# Patient Record
Sex: FEMALE | Race: WHITE | Employment: FULL TIME | URBAN - METROPOLITAN AREA
[De-identification: names, ages, dates, MRNs, and addresses within clinical notes are randomized per-mention and may not be internally consistent; named-entity substitution may affect disease eponyms.]

---

## 2017-02-17 ENCOUNTER — OFFICE VISIT (OUTPATIENT)
Dept: ORTHOPEDIC SURGERY | Age: 62
End: 2017-02-17

## 2017-02-17 VITALS
BODY MASS INDEX: 39.2 KG/M2 | DIASTOLIC BLOOD PRESSURE: 82 MMHG | SYSTOLIC BLOOD PRESSURE: 153 MMHG | HEIGHT: 62 IN | HEART RATE: 84 BPM | WEIGHT: 213 LBS

## 2017-02-17 DIAGNOSIS — Z98.890 S/P LEFT KNEE ARTHROSCOPY: ICD-10-CM

## 2017-02-17 DIAGNOSIS — M17.32 POST-TRAUMATIC OSTEOARTHRITIS OF LEFT KNEE: Primary | ICD-10-CM

## 2017-02-17 DIAGNOSIS — G89.29 CHRONIC PAIN OF LEFT KNEE: ICD-10-CM

## 2017-02-17 DIAGNOSIS — M25.562 CHRONIC PAIN OF LEFT KNEE: ICD-10-CM

## 2017-02-17 PROCEDURE — 20610 DRAIN/INJ JOINT/BURSA W/O US: CPT | Performed by: PHYSICIAN ASSISTANT

## 2017-02-17 PROCEDURE — 99213 OFFICE O/P EST LOW 20 MIN: CPT | Performed by: PHYSICIAN ASSISTANT

## 2017-03-03 RX ORDER — MELOXICAM 15 MG/1
TABLET ORAL
Qty: 30 TABLET | Refills: 0 | Status: SHIPPED | OUTPATIENT
Start: 2017-03-03 | End: 2017-04-04 | Stop reason: SDUPTHER

## 2017-04-04 RX ORDER — MELOXICAM 15 MG/1
TABLET ORAL
Qty: 30 TABLET | Refills: 0 | Status: SHIPPED | OUTPATIENT
Start: 2017-04-04 | End: 2017-05-03 | Stop reason: SDUPTHER

## 2017-04-07 ENCOUNTER — OFFICE VISIT (OUTPATIENT)
Dept: ORTHOPEDIC SURGERY | Age: 62
End: 2017-04-07

## 2017-04-07 VITALS
HEART RATE: 87 BPM | SYSTOLIC BLOOD PRESSURE: 138 MMHG | HEIGHT: 62 IN | BODY MASS INDEX: 39.2 KG/M2 | WEIGHT: 213 LBS | DIASTOLIC BLOOD PRESSURE: 81 MMHG

## 2017-04-07 DIAGNOSIS — M17.32 POST-TRAUMATIC OSTEOARTHRITIS OF LEFT KNEE: ICD-10-CM

## 2017-04-07 DIAGNOSIS — G89.29 CHRONIC PAIN OF LEFT KNEE: Primary | ICD-10-CM

## 2017-04-07 DIAGNOSIS — Z98.890 S/P LEFT KNEE ARTHROSCOPY: ICD-10-CM

## 2017-04-07 DIAGNOSIS — M25.562 CHRONIC PAIN OF LEFT KNEE: Primary | ICD-10-CM

## 2017-04-07 PROCEDURE — 99213 OFFICE O/P EST LOW 20 MIN: CPT | Performed by: PHYSICIAN ASSISTANT

## 2017-04-07 PROCEDURE — 20610 DRAIN/INJ JOINT/BURSA W/O US: CPT | Performed by: PHYSICIAN ASSISTANT

## 2017-04-07 RX ORDER — LEVOTHYROXINE SODIUM 88 UG/1
TABLET ORAL
Refills: 3 | COMMUNITY
Start: 2017-03-15

## 2017-05-03 RX ORDER — MELOXICAM 15 MG/1
TABLET ORAL
Qty: 30 TABLET | Refills: 0 | Status: SHIPPED | OUTPATIENT
Start: 2017-05-03 | End: 2017-05-31 | Stop reason: SDUPTHER

## 2017-05-31 RX ORDER — MELOXICAM 15 MG/1
TABLET ORAL
Qty: 30 TABLET | Refills: 0 | Status: SHIPPED | OUTPATIENT
Start: 2017-05-31 | End: 2017-06-29 | Stop reason: SDUPTHER

## 2017-06-02 ENCOUNTER — OFFICE VISIT (OUTPATIENT)
Dept: ORTHOPEDIC SURGERY | Age: 62
End: 2017-06-02

## 2017-06-02 VITALS
SYSTOLIC BLOOD PRESSURE: 139 MMHG | BODY MASS INDEX: 39.2 KG/M2 | HEIGHT: 62 IN | HEART RATE: 84 BPM | DIASTOLIC BLOOD PRESSURE: 77 MMHG | WEIGHT: 213 LBS

## 2017-06-02 DIAGNOSIS — Z98.890 S/P LEFT KNEE ARTHROSCOPY: ICD-10-CM

## 2017-06-02 DIAGNOSIS — M17.32 POST-TRAUMATIC OSTEOARTHRITIS OF LEFT KNEE: ICD-10-CM

## 2017-06-02 DIAGNOSIS — G89.29 CHRONIC PAIN OF LEFT KNEE: Primary | ICD-10-CM

## 2017-06-02 DIAGNOSIS — M25.562 CHRONIC PAIN OF LEFT KNEE: Primary | ICD-10-CM

## 2017-06-02 PROCEDURE — 20610 DRAIN/INJ JOINT/BURSA W/O US: CPT | Performed by: PHYSICIAN ASSISTANT

## 2017-06-02 PROCEDURE — 99213 OFFICE O/P EST LOW 20 MIN: CPT | Performed by: PHYSICIAN ASSISTANT

## 2017-06-05 ENCOUNTER — TELEPHONE (OUTPATIENT)
Dept: ORTHOPEDIC SURGERY | Age: 62
End: 2017-06-05

## 2017-06-16 ENCOUNTER — OFFICE VISIT (OUTPATIENT)
Dept: ORTHOPEDIC SURGERY | Age: 62
End: 2017-06-16

## 2017-06-16 VITALS
DIASTOLIC BLOOD PRESSURE: 80 MMHG | HEIGHT: 62 IN | BODY MASS INDEX: 39.2 KG/M2 | HEART RATE: 78 BPM | WEIGHT: 213 LBS | SYSTOLIC BLOOD PRESSURE: 140 MMHG

## 2017-06-16 DIAGNOSIS — M17.32 POST-TRAUMATIC OSTEOARTHRITIS OF LEFT KNEE: Primary | ICD-10-CM

## 2017-06-16 DIAGNOSIS — Z98.890 S/P LEFT KNEE ARTHROSCOPY: ICD-10-CM

## 2017-06-16 DIAGNOSIS — M25.562 CHRONIC PAIN OF LEFT KNEE: ICD-10-CM

## 2017-06-16 DIAGNOSIS — G89.29 CHRONIC PAIN OF LEFT KNEE: ICD-10-CM

## 2017-06-16 PROCEDURE — 20610 DRAIN/INJ JOINT/BURSA W/O US: CPT | Performed by: ORTHOPAEDIC SURGERY

## 2017-06-29 RX ORDER — MELOXICAM 15 MG/1
TABLET ORAL
Qty: 30 TABLET | Refills: 0 | Status: SHIPPED | OUTPATIENT
Start: 2017-06-29 | End: 2017-07-28 | Stop reason: SDUPTHER

## 2017-07-14 ENCOUNTER — OFFICE VISIT (OUTPATIENT)
Dept: ORTHOPEDIC SURGERY | Age: 62
End: 2017-07-14

## 2017-07-14 VITALS
SYSTOLIC BLOOD PRESSURE: 133 MMHG | WEIGHT: 213 LBS | HEART RATE: 89 BPM | BODY MASS INDEX: 39.2 KG/M2 | HEIGHT: 62 IN | DIASTOLIC BLOOD PRESSURE: 80 MMHG

## 2017-07-14 DIAGNOSIS — M17.32 POST-TRAUMATIC OSTEOARTHRITIS OF LEFT KNEE: ICD-10-CM

## 2017-07-14 DIAGNOSIS — M25.562 CHRONIC PAIN OF LEFT KNEE: Primary | ICD-10-CM

## 2017-07-14 DIAGNOSIS — G89.29 CHRONIC PAIN OF LEFT KNEE: Primary | ICD-10-CM

## 2017-07-14 DIAGNOSIS — Z98.890 S/P LEFT KNEE ARTHROSCOPY: ICD-10-CM

## 2017-07-14 PROCEDURE — 20610 DRAIN/INJ JOINT/BURSA W/O US: CPT | Performed by: PHYSICIAN ASSISTANT

## 2017-07-28 RX ORDER — MELOXICAM 15 MG/1
TABLET ORAL
Qty: 30 TABLET | Refills: 0 | Status: SHIPPED | OUTPATIENT
Start: 2017-07-28 | End: 2017-08-26 | Stop reason: SDUPTHER

## 2017-08-26 RX ORDER — MELOXICAM 15 MG/1
TABLET ORAL
Qty: 30 TABLET | Refills: 0 | Status: SHIPPED | OUTPATIENT
Start: 2017-08-26 | End: 2017-09-25 | Stop reason: SDUPTHER

## 2017-09-08 ENCOUNTER — OFFICE VISIT (OUTPATIENT)
Dept: ORTHOPEDIC SURGERY | Age: 62
End: 2017-09-08

## 2017-09-08 VITALS
WEIGHT: 213 LBS | HEIGHT: 62 IN | SYSTOLIC BLOOD PRESSURE: 139 MMHG | BODY MASS INDEX: 39.2 KG/M2 | HEART RATE: 83 BPM | DIASTOLIC BLOOD PRESSURE: 77 MMHG

## 2017-09-08 DIAGNOSIS — G89.29 CHRONIC PAIN OF LEFT KNEE: Primary | ICD-10-CM

## 2017-09-08 DIAGNOSIS — Z98.890 S/P LEFT KNEE ARTHROSCOPY: ICD-10-CM

## 2017-09-08 DIAGNOSIS — M25.562 CHRONIC PAIN OF LEFT KNEE: Primary | ICD-10-CM

## 2017-09-08 DIAGNOSIS — M17.32 POST-TRAUMATIC OSTEOARTHRITIS OF LEFT KNEE: ICD-10-CM

## 2017-09-08 PROCEDURE — 99999 PR OFFICE/OUTPT VISIT,PROCEDURE ONLY: CPT | Performed by: PHYSICIAN ASSISTANT

## 2017-09-08 PROCEDURE — 20610 DRAIN/INJ JOINT/BURSA W/O US: CPT | Performed by: PHYSICIAN ASSISTANT

## 2017-09-25 RX ORDER — MELOXICAM 15 MG/1
TABLET ORAL
Qty: 30 TABLET | Refills: 0 | Status: SHIPPED | OUTPATIENT
Start: 2017-09-25 | End: 2017-10-22 | Stop reason: SDUPTHER

## 2017-10-23 RX ORDER — MELOXICAM 15 MG/1
TABLET ORAL
Qty: 30 TABLET | Refills: 0 | Status: SHIPPED | OUTPATIENT
Start: 2017-10-23 | End: 2017-11-20 | Stop reason: SDUPTHER

## 2017-11-10 ENCOUNTER — OFFICE VISIT (OUTPATIENT)
Dept: ORTHOPEDIC SURGERY | Age: 62
End: 2017-11-10

## 2017-11-10 VITALS
HEART RATE: 74 BPM | SYSTOLIC BLOOD PRESSURE: 145 MMHG | WEIGHT: 213 LBS | BODY MASS INDEX: 39.2 KG/M2 | DIASTOLIC BLOOD PRESSURE: 78 MMHG | HEIGHT: 62 IN

## 2017-11-10 DIAGNOSIS — M17.32 POST-TRAUMATIC OSTEOARTHRITIS OF LEFT KNEE: ICD-10-CM

## 2017-11-10 DIAGNOSIS — G89.29 CHRONIC PAIN OF LEFT KNEE: Primary | ICD-10-CM

## 2017-11-10 DIAGNOSIS — Z98.890 S/P LEFT KNEE ARTHROSCOPY: ICD-10-CM

## 2017-11-10 DIAGNOSIS — M25.562 CHRONIC PAIN OF LEFT KNEE: Primary | ICD-10-CM

## 2017-11-10 PROCEDURE — 20610 DRAIN/INJ JOINT/BURSA W/O US: CPT | Performed by: PHYSICIAN ASSISTANT

## 2017-11-10 PROCEDURE — 99999 PR OFFICE/OUTPT VISIT,PROCEDURE ONLY: CPT | Performed by: PHYSICIAN ASSISTANT

## 2017-11-10 NOTE — PROGRESS NOTES
Sharp, Aching  Duration of Pain: Persistent  Date Pain First Started: 05/10/12  Aggravating Factors: Kneeling, Standing, Walking, Stairs  Limiting Behavior: Some  Relieving Factors: Rest, Ice, Other (Comment) (Tylenol)  Result of Injury: No  Work-Related Injury: No  Are there other pain locations you wish to document?: No    Patient Active Problem List   Diagnosis    S/P left knee arthroscopy, chondroplasty, synovectomy, and partial medial meniscectomy 6/26/2012    Left knee pain    Post-traumatic osteoarthritis of left knee    Effusion of left knee    Baker's cyst of left knee    Tear of medial meniscus of left knee    Morbid obesity with BMI of 40.0-44.9, adult (HCC)    Chronic pain of left knee    Numbness and tingling of left leg    Low back pain radiating to left leg     Past Medical History:   Diagnosis Date    Arthritis     Asthma     GERD (gastroesophageal reflux disease)     Hypertension     Thyroid disease      Past Surgical History:   Procedure Laterality Date    APPENDECTOMY      CHOLECYSTECTOMY      COLONOSCOPY      ENDOSCOPY, COLON, DIAGNOSTIC      KNEE ARTHROSCOPY  6 29 12    left    SINUS SURGERY         Allergies:  Seasonal    Medications:  Prior to Visit Medications    Medication Sig Taking?  Authorizing Provider   meloxicam (MOBIC) 15 MG tablet TAKE 1 TABLET BY MOUTH EVERY DAY WITH FOOD  Preethi Vierya MD   Flaxseed Oil OIL by Does not apply route  Historical Provider, MD   levothyroxine (SYNTHROID) 88 MCG tablet TK 1 T PO D  Historical Provider, MD   atorvastatin (LIPITOR) 20 MG tablet daily   Historical Provider, MD   Cholecalciferol (VITAMIN D3) 5000 UNITS TABS Take by mouth  Historical Provider, MD   B Complex-Folic Acid (B COMPLEX-VITAMIN B12 PO) Take 1 tablet by mouth  Historical Provider, MD   Multiple Vitamins-Minerals (MULTIVITAMIN & MINERAL PO) Take by mouth  Historical Provider, MD   ramipril (ALTACE) 10 MG capsule daily   Historical Provider, MD Amanda Ramires obtained today.      XRays:  (3 views: AP, lateral and patella views) of her left knee taken on 12/9/16 showed mild degenerative changes in the patellofemoral compartment, severe (bone-on-bone) degenerative changes in the medial compartment and mild degenerative changes in the lateral compartment. There is severe joint space narrowing in the medial compartment. There is neutral alignment. PROCEDURE NOTE: LEFT KNEE INJECTION  11/10/2017 at 8:32 AM   Procedure: Injection  Verbal consent was obtained. Risks and benefits were explained. Questions were encouraged and answered. Timeout Verification Completed including:    Correct patient: Glenda Jose was identified    Correct procedure    Correct site & side    Correct equipment and supplies    Staff member: Cristian Goldberg PA-C    Assistant: Mehdi Zarate       Injection Site: Superolateral    The injection site was prepped with Chlora-Prep using aseptic technique and a left knee intra-articular injection was performed with ethyl chloride for anesthetic. Depomedrol 2 ml (40 mg/ml = 80 mg total) was injected. A sterile adhesive dressing was applied. Post procedure:  Glenda Jose tolerated the treatment well. Instructions to patient:  Appropriate post injections instructions were given to Glenda Jose. Assessment (Medical Decision Making):     Glenda Jose is a 60y.o. year old female with the following diagnosis:    1. Chronic pain of left knee  CO ARTHROCENTESIS ASPIR&/INJ MAJOR JT/BURSA W/O US    CO METHYLPREDNISOLONE 40 MG INJ   2. Post-traumatic osteoarthritis of left knee  CO ARTHROCENTESIS ASPIR&/INJ MAJOR JT/BURSA W/O US    CO METHYLPREDNISOLONE 40 MG INJ   3.  S/P left knee arthroscopy, chondroplasty, synovectomy, and partial medial meniscectomy 6/26/2012         Her overall course:       []  Responding adequately to ongoing treatment    [x]  Worsening despite conservative treatment    []  Unchanged despite conservative treatment    Plan (Medical Decision Making):      I discussed the diagnosis and the treatment options with Hardik Abdul today. In Summary:  1). The various treatment options were outlined and discussed with Hardik Abdul including:      [x] Conservative care options:      physical therapy, ice, NSAIDs, bracing, and activity modification       [x] The indications for therapeutic injections Steroids and Hyluronic Acid/Synvisc/Euflexxa/Supartz    2). After considering the various options discussed, Hardik Abdul elected to pursue a course of treatment that includes the following:      [x] MEDICATIONS/REFILLS prescribed today are listed below. No refills today. [x] Physical Therapy/HEP Activities as tolerated, continue HEP       [] Script: 2 x per week x 4 weeks    [x] Ice to affected area: 15-20 minutes 4 x day    [x] Injection into her left knee today     Return to Clinic/Follow - Up:  Hardik Abdul was asked to make a follow-up appointment:    [x]  6-8 weeks if needed    Hardik Abdul was instructed to call the office if her symptoms worsen or if new symptoms appear prior to the next scheduled visit. She is specifically instructed to contact the office between now & her scheduled appointment if she has concerns related to her condition or if she needs assistance in scheduling the above tests. She is welcome to call for an appointment sooner if she has any additional concerns or questions. Patient Education Materials Provided:    Patient Instructions     Hardik Abdul was instructed to apply ice to the injection area for 15 - 20 minutes several times a day to decrease pain and and swelling. Ice (\"ICE IS YOUR FRIEND\": try using a bag of frozen peas or corn) for 15  20 minutes 3 x day. Limit activities today. You may resume normal activities tomorrow if you have no pain. For severe pain:  If after hours, she is to go to Emergency Room. During office hours she must come in to the office. D3, (at least 2,000 IU daily). Vitamin D3 is widely available without a prescription at pharmacies and buying clubs ([facility], [facility]) and on-line at sites such as Amazon.com. It comes in a variety of formulations (tablets, gelcaps, liquid) and doses (1,000 IU, 2,000 IU, 4,000 IU, 5,000 IU and even higher). The right dose for most people is 2,000 IU per day but higher doses are sometimes needed. We have not seen any problems with any of the formulations so we have no reason to recommend a specific brand. You can take your vitamin D3 at any time of the day, with or without food, with or without calcium. Because vitamin D3 is long acting, if you miss your vitamin D3 on one day you can double up the dose on a later day. Orders Placed This Encounter   Procedures    AR ARTHROCENTESIS ASPIR&/INJ MAJOR JT/BURSA W/O US    AR METHYLPREDNISOLONE 40 MG INJ       Refills/New Prescriptions:  No orders of the defined types were placed in this encounter. Today's prescription medications will be e-scribed (when appropriate) to the Patient's Preferred Pharmacy:   [facility], [address] [facility] [phone]  [address] [place] [id number]  Phone: [phone] Fax: [phone]       Lady Dl CHANEY  Electronically signed by Lady Dl CHANEY on 11/10/2017 at 8:32 AM     Contact Information:  Charbel Mcadams, [address] [facility] Staff  [phone], Option 3    This dictation was performed with a verbal recognition program (DRAGON) and it was checked for errors. It is possible that there are still dictated errors within this office note. If so, please bring any errors to my attention for an addendum. All efforts were made to ensure that this office note is accurate.      I have personally performed and/or participated in the history, physical exam and medical decision making and reviewed all pertinent clinical information unless

## 2017-11-10 NOTE — LETTER
FAXED/SENT TO Dr Maurice Menjivar MD  11/10/17, 8:33 AM        Ridge Felipe PA-C  Orthopaedic Surgery & Sports Medicine      Dear Dr Maurice Menjivar MD,    Thank you very much for your referral of Ms. Burt Trotter to me for evaluation and treatment. Attached below is my report and recommendations from Maria Elena Weller most recent office visit. I appreciate your confidence in me and thank you for allowing me the opportunity to care for your patients. If I can be of any further assistance to you on this or any other patient, please do not hesitate to contact me. Sincerely,    Ridge Felipe PA-C  Electronically signed by Ridge Felipe PA-C on 11/10/2017 at 8:33 AM     Contact Information:  Melissa Martins,  &  Clinical Staff  [phone], [address] [facility]  Orthopaedic Surgery & Sports Medicine       [address] [facility] OFFICE  [address] [place] [address], 2nd Floor  1133 [facility], [address] [place] [address] 30    [phone] Option 3   [phone] Option [phone] (fax)    [phone] (fax)       PATIENT: Burt Trotter    60 y.o.  female  [facility]: 1955   MRN:  [id number]       Date of current encounter: 11/10/2017  This encounter is evaluated as a:        New Patient Visit     Established Patient Visit    Post-Op Visit      Consult: requested by          Worker's Comp       Patient's PCP is Dr. Maurice Menjivar MD     SURGICAL FINDINGS: Left knee arthroscopy, chondroplasty, synovectomy and partial medial meniscectomy 6/29/2012        Subjective:     Chief Complaint   Patient presents with    Knee Problem     Ongoing evaluation and treatment of left knee       HPI:  Burt Trotter is a 60y.o. year old,  female complaining of left knee pain. She states that the injection she received on her last visit did help.  She has been working out doing more biking exercises to try and appointment sooner if she has any additional concerns or questions. Patient Education Materials Provided:    Patient Instructions     Charo Bain was instructed to apply ice to the injection area for 15 - 20 minutes several times a day to decrease pain and and swelling. Ice (\"ICE IS YOUR FRIEND\": try using a bag of frozen peas or corn) for 15  20 minutes 3 x day. Limit activities today. You may resume normal activities tomorrow if you have no pain. For severe pain:  If after hours, she is to go to Emergency Room. During office hours she must come in to the office. Charo Bain was instructed to call the office if there are any questions or concerns related to her condition. I have asked her to schedule a follow-up appointment for 6-8 weeks from now for re-evaluation and possible repeat injection. She is specifically instructed to contact the office between now & her scheduled appointment if she has concerns related to her condition. She is welcome to call for an appointment sooner if she has any additional concerns or questions. General Medication Instructions:  Any prescriptions must be used as directed. If you have any concerns, questions or require refills, please contact our office. If you experience any adverse reactions, stop the medication and call our office immediately. If you designate a preferred pharmacy, appropriate prescriptions will be sent to your preferred pharmacy for pickup to be use as directed. Patient Driving Instructions:  No driving if you are taking narcotic pain medications or muscle relaxers. PATIENT REMINDER:   Carry a list of your medications and allergies with you at all times. Call your pharmacy and our office at least 1 week in advance to refill prescriptions. Narcotic medications will not be refilled after hours or on weekends.          ATTENTION    As of October 2, 2014, the [facility] ([facility]) has mandated that ALL PRESCRIPTION PAIN MEDICINE cannot be called into your pharmacy. This includes:    Oxycodone (Percocet)  Hydrocodone (Vicodin, Norco)  Tramadol (Ultram)  Other    These medications must have prescriptions which are written and signed by your provider. This means that you must call ahead and come in to the office to  the paper prescription and take it to your pharmacy. We are sorry for any inconvenience but this is now the law. Diego Lal PA-C  Physician Assistant, Orthopaedic Surgery    Contact Information:  Kelly Dunn,  & Clinical Staff  [phone], Option 3         IMPORTANT NARCOTIC INFORMATION: PLEASE READ      DO NOT share your prescription medication with anyone! Sharing is illegal.  The prescription dose is based on your age, weight, and health problems. Sharing your narcotic prescription can lead to accidental death of the individual for which the prescription was not prescribed. You may not know about his/her addiction problem. Always use the same pharmacy when filling your narcotic prescriptions. DO NOT mix your narcotic prescription with alcohol. Mixing the narcotic prescription medication with alcohol causes depressive effects including breathing problems, organ malfunction, and cardiac arrest.      Always keep your narcotic medication in a locked, secured location. Keep your medication private. This is to avoid individuals from taking your medication without your knowledge. This medication is highly sought after and locking your prescriptions will protect you from being a target of crime. DO NOT stock pile your medication. This also will protect you from being a target of crime. Dispose of any unused medications properly. Do not flush the medications. Look for appropriate waste collection programs in your community and drug take back events. DO NOT drive while taking narcotic pain medication or muscle relaxers. FOR MORE INFORMATION, CHECK OUT THIS RESOURCE    For your convenience, Dr. Hortencia Meng has provided the following link that may be helpful for you if you would like more information on your Orthopaedic condition:    www. Orthoinfo. org         If you or someone you know struggles with weight issues and joint pain, please check out this important documentary:      \"Start Moving Start Living\" =  Http://startmovingWis.dm.[employer]       (YOUTUBE video SMSL FULL SD)                VITAMIN D3    Dr Hortencia Meng recommends that you add an over-the-counter supplement of vitamin D3, (at least 2,000 IU daily). Vitamin D3 is widely available without a prescription at pharmacies and buying clubs ([facility], [facility]) and on-line at sites such as Amazon.com. It comes in a variety of formulations (tablets, gelcaps, liquid) and doses (1,000 IU, 2,000 IU, 4,000 IU, 5,000 IU and even higher). The right dose for most people is 2,000 IU per day but higher doses are sometimes needed. We have not seen any problems with any of the formulations so we have no reason to recommend a specific brand. You can take your vitamin D3 at any time of the day, with or without food, with or without calcium. Because vitamin D3 is long acting, if you miss your vitamin D3 on one day you can double up the dose on a later day. Orders Placed This Encounter   Procedures    TN ARTHROCENTESIS ASPIR&/INJ MAJOR JT/BURSA W/O US    TN METHYLPREDNISOLONE 40 MG INJ       Refills/New Prescriptions:  No orders of the defined types were placed in this encounter.     Today's prescription medications will be e-scribed (when appropriate) to the Patient's Preferred Pharmacy:   [facility] [address] 28 - F [phone]  [address] [address]  Phone: [phone] Fax: [phone]       Diego Lal PA-C Electronically signed by Aminta Isidro PA-C on 11/10/2017 at 8:32 AM     Contact Information:  Nuzhat Retana, [address] [facility] Staff  [phone], Option 3    This dictation was performed with a verbal recognition program (DRAGON) and it was checked for errors. It is possible that there are still dictated errors within this office note. If so, please bring any errors to my attention for an addendum. All efforts were made to ensure that this office note is accurate. I have personally performed and/or participated in the history, physical exam and medical decision making and reviewed all pertinent clinical information unless otherwise noted.

## 2017-11-10 NOTE — PATIENT INSTRUCTIONS
Glenda Jose was instructed to apply ice to the injection area for 15 - 20 minutes several times a day to decrease pain and and swelling. Ice (\"ICE IS YOUR FRIEND\": try using a bag of frozen peas or corn) for 15  20 minutes 3 x day. Limit activities today. You may resume normal activities tomorrow if you have no pain. For severe pain:  If after hours, she is to go to Emergency Room. During office hours she must come in to the office. Glenda Jose was instructed to call the office if there are any questions or concerns related to her condition. I have asked her to schedule a follow-up appointment for 6-8 weeks from now for re-evaluation and possible repeat injection. She is specifically instructed to contact the office between now & her scheduled appointment if she has concerns related to her condition. She is welcome to call for an appointment sooner if she has any additional concerns or questions. General Medication Instructions:  Any prescriptions must be used as directed. If you have any concerns, questions or require refills, please contact our office. If you experience any adverse reactions, stop the medication and call our office immediately. If you designate a preferred pharmacy, appropriate prescriptions will be sent to your preferred pharmacy for pickup to be use as directed. Patient Driving Instructions:  No driving if you are taking narcotic pain medications or muscle relaxers. PATIENT REMINDER:   Carry a list of your medications and allergies with you at all times. Call your pharmacy and our office at least 1 week in advance to refill prescriptions. Narcotic medications will not be refilled after hours or on weekends. ATTENTION    As of October 2, 2014, the [facility] ([address] [place] [address]) has mandated that ALL PRESCRIPTION PAIN MEDICINE cannot be called into your pharmacy.     This includes:    Oxycodone (Percocet)  Hydrocodone (Vicodin, Norco)  Tramadol (Ultram)  Other    These medications must have prescriptions which are written and signed by your provider. This means that you must call ahead and come in to the office to  the paper prescription and take it to your pharmacy. We are sorry for any inconvenience but this is now the law. Monica Gonzales PA-C  Physician Assistant, Orthopaedic Surgery    Contact Information:  Roslyn Vegas,  & Clinical Staff  [phone], Option 3         IMPORTANT NARCOTIC INFORMATION: PLEASE READ     [x] DO NOT share your prescription medication with anyone! Sharing is illegal.  The prescription dose is based on your age, weight, and health problems. Sharing your narcotic prescription can lead to accidental death of the individual for which the prescription was not prescribed. You may not know about his/her addiction problem. [x] Always use the same pharmacy when filling your narcotic prescriptions. [x] DO NOT mix your narcotic prescription with alcohol. Mixing the narcotic prescription medication with alcohol causes depressive effects including breathing problems, organ malfunction, and cardiac arrest.     [x] Always keep your narcotic medication in a locked, secured location. Keep your medication private. This is to avoid individuals from taking your medication without your knowledge. This medication is highly sought after and locking your prescriptions will protect you from being a target of crime. [x] DO NOT stock pile your medication. This also will protect you from being a target of crime. [x] Dispose of any unused medications properly. Do not flush the medications. Look for appropriate waste collection programs in your community and drug take back events. [x] DO NOT drive while taking narcotic pain medication or muscle relaxers.          FOR MORE INFORMATION, CHECK OUT THIS RESOURCE    For your convenience, Dr. Milton Harp has provided the following link that may be helpful for you if you would like more information on your Orthopaedic condition:    www. Orthoinfo. org         If you or someone you know struggles with weight issues and joint pain, please check out this important documentary:      \"Start Moving Start Living\" =  Http://startmovingstMicrobondsliving.[employer]MI [employer]       (YOUTUBE video SMSL FULL SD)                VITAMIN D3    Dr Killian Valentino recommends that you add an over-the-counter supplement of vitamin D3, (at least 2,000 IU daily). Vitamin D3 is widely available without a prescription at pharmacies and buying clubs ([facility], [facility]) and on-line at sites such as Amazon.com. It comes in a variety of formulations (tablets, gelcaps, liquid) and doses (1,000 IU, 2,000 IU, 4,000 IU, 5,000 IU and even higher). The right dose for most people is 2,000 IU per day but higher doses are sometimes needed. We have not seen any problems with any of the formulations so we have no reason to recommend a specific brand. You can take your vitamin D3 at any time of the day, with or without food, with or without calcium. Because vitamin D3 is long acting, if you miss your vitamin D3 on one day you can double up the dose on a later day.

## 2017-11-20 RX ORDER — MELOXICAM 15 MG/1
TABLET ORAL
Qty: 30 TABLET | Refills: 0 | Status: SHIPPED | OUTPATIENT
Start: 2017-11-20 | End: 2017-12-29 | Stop reason: SDUPTHER

## 2017-12-29 ENCOUNTER — OFFICE VISIT (OUTPATIENT)
Dept: ORTHOPEDIC SURGERY | Age: 62
End: 2017-12-29

## 2017-12-29 ENCOUNTER — HOSPITAL ENCOUNTER (OUTPATIENT)
Dept: GENERAL RADIOLOGY | Facility: MEDICAL CENTER | Age: 62
Discharge: HOME | End: 2017-12-29
Attending: ORTHOPAEDIC SURGERY | Admitting: ORTHOPAEDIC SURGERY

## 2017-12-29 VITALS
WEIGHT: 213 LBS | BODY MASS INDEX: 39.2 KG/M2 | HEART RATE: 83 BPM | HEIGHT: 62 IN | DIASTOLIC BLOOD PRESSURE: 84 MMHG | SYSTOLIC BLOOD PRESSURE: 143 MMHG

## 2017-12-29 DIAGNOSIS — G89.29 CHRONIC PAIN OF LEFT KNEE: Primary | ICD-10-CM

## 2017-12-29 DIAGNOSIS — M25.562 CHRONIC PAIN OF LEFT KNEE: Primary | ICD-10-CM

## 2017-12-29 DIAGNOSIS — M17.32 POST-TRAUMATIC OSTEOARTHRITIS OF LEFT KNEE: ICD-10-CM

## 2017-12-29 DIAGNOSIS — Z98.890 S/P LEFT KNEE ARTHROSCOPY: ICD-10-CM

## 2017-12-29 DIAGNOSIS — M71.22 BAKER'S CYST OF KNEE, LEFT: ICD-10-CM

## 2017-12-29 PROCEDURE — 20610 DRAIN/INJ JOINT/BURSA W/O US: CPT | Performed by: PHYSICIAN ASSISTANT

## 2017-12-29 PROCEDURE — 99999 PR OFFICE/OUTPT VISIT,PROCEDURE ONLY: CPT | Performed by: PHYSICIAN ASSISTANT

## 2017-12-29 RX ORDER — MELOXICAM 15 MG/1
15 TABLET ORAL DAILY
Qty: 90 TABLET | Refills: 0 | Status: SHIPPED | OUTPATIENT
Start: 2017-12-29 | End: 2018-03-29

## 2017-12-29 NOTE — PATIENT INSTRUCTIONS
Norco)  Tramadol (Ultram)  Other    These medications must have prescriptions which are written and signed by your provider. This means that you must call ahead and come in to the office to  the paper prescription and take it to your pharmacy. We are sorry for any inconvenience but this is now the law. Monica Gonzales PA-C  Physician Assistant, Orthopaedic Surgery    Contact Information:  Roslyn Vegas,  & Clinical Staff  [phone], Option 3         IMPORTANT NARCOTIC INFORMATION: PLEASE READ     [x] DO NOT share your prescription medication with anyone! Sharing is illegal.  The prescription dose is based on your age, weight, and health problems. Sharing your narcotic prescription can lead to accidental death of the individual for which the prescription was not prescribed. You may not know about his/her addiction problem. [x] Always use the same pharmacy when filling your narcotic prescriptions. [x] DO NOT mix your narcotic prescription with alcohol. Mixing the narcotic prescription medication with alcohol causes depressive effects including breathing problems, organ malfunction, and cardiac arrest.     [x] Always keep your narcotic medication in a locked, secured location. Keep your medication private. This is to avoid individuals from taking your medication without your knowledge. This medication is highly sought after and locking your prescriptions will protect you from being a target of crime. [x] DO NOT stock pile your medication. This also will protect you from being a target of crime. [x] Dispose of any unused medications properly. Do not flush the medications. Look for appropriate waste collection programs in your community and drug take back events. [x] DO NOT drive while taking narcotic pain medication or muscle relaxers.          FOR MORE INFORMATION, CHECK OUT THIS RESOURCE    For your convenience, Dr. Milton Harp has provided the following link

## 2017-12-29 NOTE — LETTER
Medication Sig Taking? Authorizing Provider   meloxicam (MOBIC) 15 MG tablet TAKE 1 TABLET BY MOUTH EVERY DAY WITH FOOD  Kira Vázquez MD   Flaxseed Oil OIL by Does not apply route  Historical Provider, MD   levothyroxine (SYNTHROID) 88 MCG tablet TK 1 T PO D  Historical Provider, MD   atorvastatin (LIPITOR) 20 MG tablet daily   Historical Provider, MD   Cholecalciferol (VITAMIN D3) 5000 UNITS TABS Take by mouth  Historical Provider, MD   B Complex-Folic Acid (B COMPLEX-VITAMIN B12 PO) Take 1 tablet by mouth  Historical Provider, MD   Multiple Vitamins-Minerals (MULTIVITAMIN & MINERAL PO) Take by mouth  Historical Provider, MD   ramipril (ALTACE) 10 MG capsule daily   Historical Provider, MD   ADVAIR DISKUS 250-50 MCG/DOSE AEPB daily   Historical Provider, MD   clonazePAM (KLONOPIN) 0.5 MG tablet Take 0.5 mg by mouth daily. Historical Provider, MD   fluticasone (FLONASE) 50 MCG/ACT nasal spray 1 spray by Nasal route daily. Historical Provider, MD   lansoprazole (PREVACID) 30 MG capsule Take 30 mg by mouth daily. Historical Provider, MD   escitalopram (LEXAPRO) 10 MG tablet Take 10 mg by mouth daily. Historical Provider, MD   fexofenadine (ALLEGRA) 180 MG tablet Take 180 mg by mouth daily. Historical Provider, MD   albuterol (PROVENTIL HFA;VENTOLIN HFA) 108 (90 BASE) MCG/ACT inhaler Inhale 2 puffs into the lungs every 6 hours as needed. Historical Provider, MD   Glucosamine-Chondroitin 500-400 MG CAPS Take by mouth   Historical Provider, MD     Social History     Social History    Marital status:      Spouse name: N/A    Number of children: N/A    Years of education: N/A     Occupational History    Not on file.      Social History Main Topics    Smoking status: Never Smoker    Smokeless tobacco: Never Used    Alcohol use No    Drug use: No    Sexual activity: Not on file     Other Topics Concern    Not on file     Social History Narrative    No narrative on file History reviewed. No pertinent family history. Review of Systems (ROS):    Performed. Vonnie White's review of systems has been performed by intake and observation. The most recent ROS was scanned into the media tab of the chart on 2/17/2017. She has been instructed to contact her primary care physician regarding ROS issues if not already being addressed at this time. There are no recent changes. Objective:   Physical Exam  Vital Signs:  BP (!) 143/84   Pulse 83   Ht 5' 2\" (1.575 m)   Wt 213 lb (96.6 kg)   BMI 38.96 kg/m²      Constitution:  Generally, True Ro is  alert,  appears stated age, and  in no distress. Her general body habitus is  Cachectic   Thin   Normal   Obese   Morbidly Obese    Head:  Normocephalic  Eyes:  Extra-occular muscles intact    Wears glasses  Left Ear:  External Ear normal   Right Ear:  External Ear normal   Nose:  Normal  Mouth:  Oral mucosa moist   No perioral lesions    Pulmonary:  Respirations unlabored and regular  Skin:  Warm  Well perfused     Psychiatric:    Good judgement and insight   Oriented to  person,  place, and  time   Mood appropriate for circumstances    Gait:  Gait is  Normal   Impaired slight limp  Assistive Device:  None   Knee Brace   Cane   Crutches    Walker    Wheelchair   Other    ORTHOPAEDIC KNEE EXAM: LEFT   Inspection:   Skin intact without abrasion or lacerations. Ecchymosis:   none   mild   moderate   severe   Atrophy:   none   mild   moderate   severe   Effusion:  none   mild   moderate   severe   Scar / Surgical incision(s):   A-Scope Portals   Open Surgical Incision(s)    Alignment:   Normal   Varus  Valgus    Range of Motion:   Normal Knee ROM          Deferred: acute injury/post-surgery/pain    Limited ROM    Palpation:    No Tendernesst   Tenderness: medial joint line and anterior  mild   moderate   severe    Patellofemoral Crepitation:   none   mild   moderate   severe   Baker's Cyst:   none   small   medium   large Arjun's Sign:  negative   positive    Motor Function:    No gross motor weakness   Motor weakness:   mild   moderate   severe     Neurologic:   Sensation to light touch intact    Coordination / proprioception intact    Circulation:   The limb is warm and well perfused   Capillary refill is intact. Edema   none   mild   moderate   severe   Venous stasis changes   none   mild   moderate   severe    Contralateral Knee:   No pain with ROM     Bilateral Hip Exam:   Negative logroll     Data Reviewed:      No imaging studies were obtained today.      XRays:  (3 views: AP, lateral and patella views) of her left knee taken on 12/9/16 showed mild degenerative changes in the patellofemoral compartment, severe (bone-on-bone) degenerative changes in the medial compartment and mild degenerative changes in the lateral compartment. There is severe joint space narrowing in the medial compartment. There is neutral alignment. Doppler Ultrasound: lower extremity dated 12/12/17 shows:  Impressions   Right Impression   No evidence of deep vein thrombosis involving the right common femoral vein. Left Impression   No evidence of deep vein or superficial vein thrombosis involving the left   lower extremity. Evidence of a small cystic structure seen in the popliteal   space of the left leg.       Conclusions        Summary        No evidence of deep vein or superficial thrombosis involving the left lower    extremity and the contralateral proximal common femoral vein. PROCEDURE NOTE: LEFT KNEE INJECTION  12/29/2017 at 9:30 AM   Procedure: Injection  Verbal consent was obtained. Risks and benefits were explained. Questions were encouraged and answered.       Timeout Verification Completed including:    Correct patient: Heydi Hager was identified    Correct procedure    Correct site & side    Correct equipment and supplies    Staff member: Aminta Isidro PA-C    Assistant: John Jimenes       Injection Site: Superolateral The injection site was prepped with Chlora-Prep using aseptic technique and a left knee intra-articular injection was performed with ethyl chloride for anesthetic. Depomedrol 2 ml (40 mg/ml = 80 mg total) was injected. A sterile adhesive dressing was applied. Post procedure:  Heydi Hager tolerated the treatment well. Instructions to patient:  Appropriate post injections instructions were given to Heydi Hager. Assessment (Medical Decision Making):     Heydi Hager is a 60y.o. year old female with the following diagnosis:    1. Chronic pain of left knee  AZ ARTHROCENTESIS ASPIR&/INJ MAJOR JT/BURSA W/O US    AZ METHYLPREDNISOLONE 40 MG INJ   2. Post-traumatic osteoarthritis of left knee  AZ ARTHROCENTESIS ASPIR&/INJ MAJOR JT/BURSA W/O US    AZ METHYLPREDNISOLONE 40 MG INJ   3. S/P left knee arthroscopy, chondroplasty, synovectomy, and partial medial meniscectomy 6/26/2012     4. Baker's cyst of knee, left         Her overall course:         Responding adequately to ongoing treatment      Worsening despite conservative treatment      Unchanged despite conservative treatment    Plan (Medical Decision Making):      I discussed the diagnosis and the treatment options with Heydi Hager today. In Summary:  1). The various treatment options were outlined and discussed with Heydi Hager including:       Conservative care options:      physical therapy, ice, NSAIDs, bracing, and activity modification        The indications for therapeutic injections Steroids and Hyluronic Acid/Synvisc/Euflexxa/Supartz    2). After considering the various options discussed, Heydi Hager elected to pursue a course of treatment that includes the following:       MEDICATIONS/REFILLS prescribed today are listed below.        Meloxicam.      Physical Therapy/HEP Activities as tolerated        Script: 2 x per week x 4 weeks     Ice to affected area: 15-20 minutes 4 x day     Injection into her left knee today to my attention for an addendum. All efforts were made to ensure that this office note is accurate. I have personally performed and/or participated in the history, physical exam and medical decision making and reviewed all pertinent clinical information unless otherwise noted.

## 2017-12-29 NOTE — PROGRESS NOTES
Maday Mcconnell PA-C  Orthopaedic Surgery & Sports Medicine      [x] [address] [facility] OFFICE   [] [facility] OFFICE  [address]  [address]    [place], [address] [facility]          [place], [address]    [phone] Option 3   [phone] Option [phone] (fax)    [phone] (fax)       PATIENT: Jose Chase    60 y.o.  female  [facility]: 1955   MRN:  [id number]       Date of current encounter: 12/29/2017  This encounter is evaluated as a:       [] New Patient Visit    [x] Established Patient Visit   [] Post-Op Visit     [] Consult: requested by         [] Worker's Comp       Patient's PCP is Dr. Jackie Peralta MD     SURGICAL FINDINGS: Left knee arthroscopy, chondroplasty, synovectomy and partial medial meniscectomy 6/29/2012        Subjective:     Chief Complaint   Patient presents with    Knee Pain     ongoing evaluation and treatment of left knee        HPI:  Jose Chase is a 60y.o. year old,  female complaining of left knee pain. She did go to the ER on 12/12/17 with increased left knee pain and swelling. She did not have a DVT. A incidental finding of a baker's cyst was found. Since then her knee has been doing well and she has not had swelling. She rates her pain as a 1 out of 10 at rest and a 4 out of 10 with activity. She describes her pain as sharp, aching and grinding. It is persistent and intermittent. Bending, exercise, kneeling, squatting, standing and walking aggravate her pain. She does feel that her left knee limits her behavior some. Rest, ice and meloxicam help relieve her pain. She does not have night pain. She does have morning stiffness.     PAIN ASSESSMENT:   Pain Assessment  Location of Pain: Knee  Location Modifiers: Left  Severity of Pain: 4  Quality of Pain: Sharp, Aching, Grinding  Duration of Pain: Persistent  Frequency of Pain: Intermittent  Date Pain First Started:  (ongoing several years)  Aggravating Factors: Bending, Exercise, Kneeling, Squatting, Standing, Walking  Limiting Behavior: Some  Relieving Factors: Rest, Ice, Nsaids  Result of Injury: Yes  Work-Related Injury: No  Are there other pain locations you wish to document?: No    Patient Active Problem List   Diagnosis    S/P left knee arthroscopy, chondroplasty, synovectomy, and partial medial meniscectomy 6/26/2012    Left knee pain    Post-traumatic osteoarthritis of left knee    Effusion of left knee    Baker's cyst of left knee    Tear of medial meniscus of left knee    Morbid obesity with BMI of 40.0-44.9, adult (HCC)    Chronic pain of left knee    Numbness and tingling of left leg    Low back pain radiating to left leg     Past Medical History:   Diagnosis Date    Arthritis     Asthma     GERD (gastroesophageal reflux disease)     Hypertension     Thyroid disease      Past Surgical History:   Procedure Laterality Date    APPENDECTOMY      CHOLECYSTECTOMY      COLONOSCOPY      ENDOSCOPY, COLON, DIAGNOSTIC      KNEE ARTHROSCOPY  6 29 12    left    SINUS SURGERY         Allergies:  Seasonal    Medications:  Prior to Visit Medications    Medication Sig Taking?  Authorizing Provider   meloxicam (MOBIC) 15 MG tablet TAKE 1 TABLET BY MOUTH EVERY DAY WITH FOOD  Tanya Yoder MD   Flaxseed Oil OIL by Does not apply route  Historical Provider, MD   levothyroxine (SYNTHROID) 88 MCG tablet TK 1 T PO D  Historical Provider, MD   atorvastatin (LIPITOR) 20 MG tablet daily   Historical Provider, MD   Cholecalciferol (VITAMIN D3) 5000 UNITS TABS Take by mouth  Historical Provider, MD   B Complex-Folic Acid (B COMPLEX-VITAMIN B12 PO) Take 1 tablet by mouth  Historical Provider, MD   Multiple Vitamins-Minerals (MULTIVITAMIN & MINERAL PO) Take by mouth  Historical Provider, MD   ramipril (ALTACE) 10 MG capsule daily   Historical Provider, MD   ADVAIR DISKUS 250-50 MCG/DOSE AEPB daily   Historical Provider, MD   clonazePAM (KLONOPIN) 0.5 MG tablet Take 0.5 mg by mouth daily.    Historical Provider, MD   fluticasone (FLONASE) 50 MCG/ACT nasal spray 1 spray by Nasal route daily. Historical Provider, MD   lansoprazole (PREVACID) 30 MG capsule Take 30 mg by mouth daily. Historical Provider, MD   escitalopram (LEXAPRO) 10 MG tablet Take 10 mg by mouth daily. Historical Provider, MD   fexofenadine (ALLEGRA) 180 MG tablet Take 180 mg by mouth daily. Historical Provider, MD   albuterol (PROVENTIL HFA;VENTOLIN HFA) 108 (90 BASE) MCG/ACT inhaler Inhale 2 puffs into the lungs every 6 hours as needed. Historical Provider, MD   Glucosamine-Chondroitin 500-400 MG CAPS Take by mouth   Historical Provider, MD     Social History     Social History    Marital status:      Spouse name: N/A    Number of children: N/A    Years of education: N/A     Occupational History    Not on file. Social History Main Topics    Smoking status: Never Smoker    Smokeless tobacco: Never Used    Alcohol use No    Drug use: No    Sexual activity: Not on file     Other Topics Concern    Not on file     Social History Narrative    No narrative on file     History reviewed. No pertinent family history. Review of Systems (ROS):    [x]Performed. Vonnie  Christopher's review of systems has been performed by intake and observation. The most recent ROS was scanned into the media tab of the chart on 2/17/2017. She has been instructed to contact her primary care physician regarding ROS issues if not already being addressed at this time. There are no recent changes. Objective:   Physical Exam  Vital Signs:  BP (!) 143/84   Pulse 83   Ht 5' 2\" (1.575 m)   Wt 213 lb (96.6 kg)   BMI 38.96 kg/m²     Constitution:  Generally, True Ro is [x] alert, [x] appears stated age, and [x] in no distress.   Her general body habitus is [] Cachectic  [] Thin  [] Normal  [x] Obese  [] Morbidly Obese    Head: [x] Normocephalic  Eyes: [x] Extra-occular muscles intact  [x]  Wears glasses  Left Ear: [x] External Ear normal   Right Ear: [x] External Ear normal   Nose: [x] Normal  Mouth: [x] Oral mucosa moist  [x] No perioral lesions    Pulmonary: [x] Respirations unlabored and regular  Skin: [x] Warm [x] Well perfused     Psychiatric:   [x] Good judgement and insight  [x] Oriented to [x] person, [x] place, and [x] time  [x] Mood appropriate for circumstances    Gait:  Gait is [] Normal  [x] Impaired slight limp  Assistive Device: [x] None  [] Knee Brace  [] Christ Coup  [] Crutches   [] Alexis Grime   [] Wheelchair  [] Other    ORTHOPAEDIC KNEE EXAM: LEFT   Inspection:  [x] Skin intact without abrasion or lacerations. [x] Ecchymosis:  [x] none  [] mild  [] moderate  [] severe  [x] Atrophy:  [x] none  [] mild  [] moderate  [] severe  [x] Effusion: [x] none  [] mild  [] moderate  [] severe  [x] Scar / Surgical incision(s): [x] A-Scope Portals  [] Open Surgical Incision(s)    Alignment:  [x] Normal  [] Varus [] Valgus    Range of Motion:  [] Normal Knee ROM         [] Deferred: acute injury/post-surgery/pain   [x] Limited ROM    Palpation:   [] No Tendernesst  [x] Tenderness: medial joint line and anterior [x] mild  [] moderate  [] severe   [x] Patellofemoral Crepitation:  [] none  [x] mild  [] moderate  [] severe  [x] Baker's Cyst:  [x] none  [] small  [] medium  [] large  [x] Arjun's Sign: [x] negative  [] positive    Motor Function:   [x] No gross motor weakness  [] Motor weakness:  [] mild  [] moderate  [] severe     Neurologic:  [x] Sensation to light touch intact   [x] Coordination / proprioception intact    Circulation:  [x] The limb is warm and well perfused  [x] Capillary refill is intact.   [x] Edema  [x] none  [] mild  [] moderate  [] severe  [x] Venous stasis changes  [x] none  [] mild  [] moderate  [] severe    Contralateral Knee:  [x] No pain with ROM     Bilateral Hip Exam:  [x] Negative logroll     Data Reviewed:      No imaging studies were obtained today.      XRays:  (3 views: AP, lateral and patella views) of includes:    Oxycodone (Percocet)  Hydrocodone (Vicodin, Norco)  Tramadol (Ultram)  Other    These medications must have prescriptions which are written and signed by your provider. This means that you must call ahead and come in to the office to  the paper prescription and take it to your pharmacy. We are sorry for any inconvenience but this is now the law. Wilmer Espinoza PA-C  Physician Assistant, Orthopaedic Surgery    Contact Information:  Don Bailey,  & Clinical Staff  [phone], Option 3         IMPORTANT NARCOTIC INFORMATION: PLEASE READ     [x] DO NOT share your prescription medication with anyone! Sharing is illegal.  The prescription dose is based on your age, weight, and health problems. Sharing your narcotic prescription can lead to accidental death of the individual for which the prescription was not prescribed. You may not know about his/her addiction problem. [x] Always use the same pharmacy when filling your narcotic prescriptions. [x] DO NOT mix your narcotic prescription with alcohol. Mixing the narcotic prescription medication with alcohol causes depressive effects including breathing problems, organ malfunction, and cardiac arrest.     [x] Always keep your narcotic medication in a locked, secured location. Keep your medication private. This is to avoid individuals from taking your medication without your knowledge. This medication is highly sought after and locking your prescriptions will protect you from being a target of crime. [x] DO NOT stock pile your medication. This also will protect you from being a target of crime. [x] Dispose of any unused medications properly. Do not flush the medications. Look for appropriate waste collection programs in your community and drug take back events. [x] DO NOT drive while taking narcotic pain medication or muscle relaxers.          FOR MORE INFORMATION, CHECK OUT THIS RESOURCE    For your convenience, Dr. Abida Al has provided the following link that may be helpful for you if you would like more information on your Orthopaedic condition:    www. Orthoinfo. org         If you or someone you know struggles with weight issues and joint pain, please check out this important documentary:      \"Start Moving Start Living\" =  Http://[email or web address]       (YOUTUBE video SMSL FULL SD)                  No orders of the defined types were placed in this encounter. Refills/New Prescriptions:  No orders of the defined types were placed in this encounter. Today's prescription medications will be e-scribed (when appropriate) to the Patient's Preferred Pharmacy:   [facility], [address] [place] [address] [phone] - f [phone]  [address] [place] [id number]  Phone: [phone] Fax: [phone]       Chelsey Wallis PA-C  Electronically signed by Chelsey Wallis PA-C on 12/29/2017 at 9:30 AM     Contact Information:  Leopold Gut, [facility] Staff  [phone], Option 3    This dictation was performed with a verbal recognition program (DRAGON) and it was checked for errors. It is possible that there are still dictated errors within this office note. If so, please bring any errors to my attention for an addendum. All efforts were made to ensure that this office note is accurate. I have personally performed and/or participated in the history, physical exam and medical decision making and reviewed all pertinent clinical information unless otherwise noted.

## 2019-07-31 ENCOUNTER — HOSPITAL ENCOUNTER (OUTPATIENT)
Dept: MAMMOGRAPHY | Age: 64
Discharge: HOME | End: 2019-08-05
Payer: COMMERCIAL

## 2019-07-31 DIAGNOSIS — Z12.31 VISIT FOR SCREENING MAMMOGRAM: ICD-10-CM

## 2019-07-31 PROCEDURE — 77067 SCR MAMMO BI INCL CAD: CPT

## 2020-07-09 NOTE — PROGRESS NOTES
DEPO-MEDROL CORTISONE INJECTION  NDC# [id number]  LOT# [id number]  EXP.  DATE: 4/2020  SITE: Lt. Knee Yes